# Patient Record
Sex: FEMALE
[De-identification: names, ages, dates, MRNs, and addresses within clinical notes are randomized per-mention and may not be internally consistent; named-entity substitution may affect disease eponyms.]

---

## 2023-02-21 PROBLEM — Z00.00 ENCOUNTER FOR PREVENTIVE HEALTH EXAMINATION: Status: ACTIVE | Noted: 2023-02-21

## 2023-02-22 ENCOUNTER — APPOINTMENT (OUTPATIENT)
Dept: ORTHOPEDIC SURGERY | Facility: CLINIC | Age: 53
End: 2023-02-22
Payer: COMMERCIAL

## 2023-02-22 DIAGNOSIS — M54.12 RADICULOPATHY, CERVICAL REGION: ICD-10-CM

## 2023-02-22 PROCEDURE — 72170 X-RAY EXAM OF PELVIS: CPT

## 2023-02-22 PROCEDURE — 99204 OFFICE O/P NEW MOD 45 MIN: CPT

## 2023-02-22 PROCEDURE — 73564 X-RAY EXAM KNEE 4 OR MORE: CPT | Mod: RT

## 2023-02-22 RX ORDER — MELOXICAM 15 MG/1
15 TABLET ORAL
Qty: 30 | Refills: 1 | Status: ACTIVE | COMMUNITY
Start: 2023-02-22 | End: 1900-01-01

## 2023-02-22 NOTE — HISTORY OF PRESENT ILLNESS
[0] : 0 [Dull/Aching] : dull/aching [Sharp] : sharp [Throbbing] : throbbing [Intermittent] : intermittent [de-identified] : 02/22/2023 DARIUS LAURENT 53 year old F is here for Right Knee/Lower back. States knee started after a hike in December. Has pain with stairs. No specific injury. Had prednisone recently which helped with the knee pain. Patient lives full time currently in the [facility].  \par \par  History of C5 radiculopathy has had PT in the past. Temporary relieve with PT.  Has pain in the LUE and numbness and tingling. Did not get relieve with prednisone. Does not want injections. Was offered surgery by previous doctor and declined surgery. [] : no [FreeTextEntry1] : Right Knee

## 2023-02-22 NOTE — IMAGING
[de-identified] : Knee Exam\par Alignment: Neutral \par Effusion: None\par Atrophy: None                                                \par Stable to Varus/valgus stress\par Posterior Drawer Test: negative\par Anterior Drawer Test: Negative\par Knee Extension/Flexion: 0 / 120\par \par Medial/lateral compartments\par Medial joint line: No Tenderness\par Lateral joint line: No Tenderness\par Adrianne test: negative\par \par Patellofemoral joint\par Medial patellar facet: no tenderness\par Patellar grind: Negative\par \par Tendons:\par Pes Anserine: No tenderness\par Gerdy’s Tubercle/ IT Band: No tenderness\par Quadriceps Tendon: No Tenderness\par patellar tendon: no Tenderness\par Tibial tubercle: not tenderness\par Calf: no Tenderness\par \par Neurovascular exam\par Muscle strength: 5/5\par Sensation to light touch: intact\par Distal pulses: 2+\par \par Left upper extremity\par 5/5 strength upper extremity\par SILT grossly intact.\par \par IMAGING:\par 2/22/23: 4v R mild OA in bilateral knees PF,

## 2023-04-18 ENCOUNTER — APPOINTMENT (OUTPATIENT)
Dept: ORTHOPEDIC SURGERY | Facility: CLINIC | Age: 53
End: 2023-04-18
Payer: COMMERCIAL

## 2023-04-18 DIAGNOSIS — M65.311 TRIGGER THUMB, RIGHT THUMB: ICD-10-CM

## 2023-04-18 DIAGNOSIS — M18.11 UNILATERAL PRIMARY OSTEOARTHRITIS OF FIRST CARPOMETACARPAL JOINT, RIGHT HAND: ICD-10-CM

## 2023-04-18 PROCEDURE — 99213 OFFICE O/P EST LOW 20 MIN: CPT

## 2023-04-18 PROCEDURE — 73140 X-RAY EXAM OF FINGER(S): CPT | Mod: RT

## 2023-04-18 NOTE — IMAGING
[de-identified] : RIGHT HAND\par skin intact. mild swelling of thumb.\par TTP to thumb A1 pulley > thumb A1 pulley.\par good wrist extension, flexion.\par good EPL, FPL. good finger extension, flex to full fist. good finger abduction and adduction. \par SILT median, ulnar, radial distributions.\par palpable radial pulse, brisk cap refill all digits.\par + triggering of thumb.\par \par \par XRAYS OF RIGHT THUMB: no acute displaced fracture or dislocation. djd of DIPJ.

## 2023-04-18 NOTE — ASSESSMENT
[FreeTextEntry1] : The condition was explained to the patient.\par \par Discussed that arthritis is a progressive degenerative process, and symptoms may have a waxing/waning course, which may be exacerbated by activity or trauma. Discussed treatment options - activity modification, bracing, steroid injection, or last resort surgery.\par Discussed increased propensity for stiffness due to underlying arthritis.\par \par Discussed risks and benefits of treatment options for tenosynovitis - activity modification, NSAID, splint, steroid injection, or surgery.\par \par Patient declined CSI.\par - recommend NSAID, activity modification, moist heat PRN.\par Reviewed contra-indicated medical conditions (eg liver disease, kidney disease, or GI ulcer/bleeding) or medications (eg blood thinners). Discussed possible GI and blood pressure side effects.\par \par F/u PRN.

## 2023-04-18 NOTE — HISTORY OF PRESENT ILLNESS
[Gradual] : gradual [Dull/Aching] : dull/aching [Intermittent] : intermittent [de-identified] : 4/18/23: 52yo RHD female (not working) presents for RIGHT thumb pain and catching/locking x 1 month.\par Denies injury.\par Not currently taking any medication for this.\par \par Hx: asthma. GERD. [] : no [FreeTextEntry1] : right thumb  [FreeTextEntry5] : DARIUS webb [RHD] 53 year old female is here today complaining of right thumb pain. onset of pain ~1 month ago, denies injury. pt is experiencing locking as well as pain.

## 2023-05-07 ENCOUNTER — FORM ENCOUNTER (OUTPATIENT)
Age: 53
End: 2023-05-07

## 2023-05-08 ENCOUNTER — APPOINTMENT (OUTPATIENT)
Dept: MRI IMAGING | Facility: CLINIC | Age: 53
End: 2023-05-08
Payer: COMMERCIAL

## 2023-05-08 PROCEDURE — 73721 MRI JNT OF LWR EXTRE W/O DYE: CPT | Mod: RT

## 2023-06-07 ENCOUNTER — APPOINTMENT (OUTPATIENT)
Dept: ORTHOPEDIC SURGERY | Facility: CLINIC | Age: 53
End: 2023-06-07
Payer: COMMERCIAL

## 2023-06-07 VITALS — WEIGHT: 180 LBS | HEIGHT: 62 IN | BODY MASS INDEX: 33.13 KG/M2

## 2023-06-07 PROCEDURE — 20610 DRAIN/INJ JOINT/BURSA W/O US: CPT | Mod: RT

## 2023-06-07 PROCEDURE — 99214 OFFICE O/P EST MOD 30 MIN: CPT | Mod: 25

## 2023-06-07 RX ORDER — METHYLPREDNISOLONE 4 MG/1
4 TABLET ORAL
Qty: 1 | Refills: 0 | Status: ACTIVE | COMMUNITY
Start: 2023-06-07 | End: 1900-01-01

## 2023-06-07 NOTE — IMAGING
[de-identified] : Knee Exam\par Alignment: Neutral \par Effusion: None\par Atrophy: None                                                \par Stable to Varus/valgus stress\par Posterior Drawer Test: negative\par Anterior Drawer Test: Negative\par Knee Extension/Flexion: 0 / 120\par \par Medial/lateral compartments\par Medial joint line: No Tenderness\par Lateral joint line: No Tenderness\par Adrianne test: negative\par \par Patellofemoral joint\par Medial patellar facet: no tenderness\par Patellar grind: Negative\par \par Tendons:\par Pes Anserine: No tenderness\par Gerdy’s Tubercle/ IT Band: No tenderness\par Quadriceps Tendon: No Tenderness\par patellar tendon: no Tenderness\par Tibial tubercle: not tenderness\par Calf: no Tenderness\par \par Neurovascular exam\par Muscle strength: 5/5\par Sensation to light touch: intact\par Distal pulses: 2+\par \par Left upper extremity\par 5/5 strength upper extremity\par SILT grossly intact.\par \par IMAGING:\par 2/22/23: 4v R mild OA in bilateral knees PF\par MRI:\par 1. Medial meniscal tear.\par 2. Lateral meniscal tear.\par 3. Arthrosis at the medial joint with no marrow edema or fracture.\par 4. 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 Joint effusion with synovitis.\par 6. Anterior soft tissue edema of the patella with no discrete bursitis and no fracture.\par 7. Hamstring and gastrocnemius tendinopathy with insertional fraying, soft tissue edema, and medial bursitis \par overlying the hamstrings of the tibia. no gum bleeding/no skin lumps

## 2023-06-07 NOTE — ASSESSMENT
[FreeTextEntry1] : 52 y/o F with PF OA, hx cervical radiculopathy\par All options were explained to patient including BOBO for neck, CSI for knee. Declines these measures\par Meloxicam prescribed and new PT rx given\par \par 06/07/2023 CSI of the right knee today

## 2023-06-07 NOTE — HISTORY OF PRESENT ILLNESS
[Dull/Aching] : dull/aching [Sharp] : sharp [Throbbing] : throbbing [Intermittent] : intermittent [8] : 8 [5] : 5 [Nothing helps with pain getting better] : Nothing helps with pain getting better [Sitting] : sitting [Walking] : walking [Exercising] : exercising [Stairs] : stairs [de-identified] : 02/22/2023 DARIUS LAURENT 53 year old F is here for Right Knee/Lower back. States knee started after a hike in December. Has pain with stairs. No specific injury. Had prednisone recently which helped with the knee pain. Patient lives full time currently in the [facility].  \par \par  History of C5 radiculopathy has had PT in the past. Temporary relieve with PT.  Has pain in the LUE and numbness and tingling. Did not get relieve with prednisone. Does not want injections. Was offered surgery by previous doctor and declined surgery.\par \par 06/07/2023 DARIUS LAURENT 53 year old female presents for Right knee/ Lower Back. Patient is here to review MRI results. Pain is on going, no improvement with physical therapy. Movement is limited due to pain levels.  [] : no [FreeTextEntry1] : Right Knee

## 2023-07-26 ENCOUNTER — APPOINTMENT (OUTPATIENT)
Dept: ORTHOPEDIC SURGERY | Facility: CLINIC | Age: 53
End: 2023-07-26
Payer: COMMERCIAL

## 2023-07-26 ENCOUNTER — TRANSCRIPTION ENCOUNTER (OUTPATIENT)
Age: 53
End: 2023-07-26

## 2023-07-26 DIAGNOSIS — M17.10 UNILATERAL PRIMARY OSTEOARTHRITIS, UNSPECIFIED KNEE: ICD-10-CM

## 2023-07-26 PROCEDURE — 99213 OFFICE O/P EST LOW 20 MIN: CPT | Mod: 25

## 2023-07-26 NOTE — IMAGING
[de-identified] : Knee Exam\par Alignment: Neutral \par Effusion: None\par Atrophy: None                                                \par Stable to Varus/valgus stress\par Posterior Drawer Test: negative\par Anterior Drawer Test: Negative\par Knee Extension/Flexion: 0 / 120\par \par Medial/lateral compartments\par Medial joint line: No Tenderness\par Lateral joint line: No Tenderness\par Adrianne test: negative\par \par Patellofemoral joint\par Medial patellar facet: no tenderness\par Patellar grind: Negative\par \par Tendons:\par Pes Anserine: No tenderness\par Gerdy’s Tubercle/ IT Band: No tenderness\par Quadriceps Tendon: No Tenderness\par patellar tendon: no Tenderness\par Tibial tubercle: not tenderness\par Calf: no Tenderness\par \par Neurovascular exam\par Muscle strength: 5/5\par Sensation to light touch: intact\par Distal pulses: 2+\par \par Left upper extremity\par 5/5 strength upper extremity\par SILT grossly intact.\par \par IMAGING:\par 2/22/23: 4v R mild OA in bilateral knees PF\par MRI:\par 1. Medial meniscal tear.\par 2. Lateral meniscal tear.\par 3. Arthrosis at the medial joint with no marrow edema or fracture.\par 4. 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 Joint effusion with synovitis.\par 6. Anterior soft tissue edema of the patella with no discrete bursitis and no fracture.\par 7. Hamstring and gastrocnemius tendinopathy with insertional fraying, soft tissue edema, and medial bursitis \par overlying the hamstrings of the tibia.

## 2023-07-26 NOTE — HISTORY OF PRESENT ILLNESS
[8] : 8 [5] : 5 [Dull/Aching] : dull/aching [Sharp] : sharp [Throbbing] : throbbing [Intermittent] : intermittent [Nothing helps with pain getting better] : Nothing helps with pain getting better [Sitting] : sitting [Walking] : walking [Exercising] : exercising [Stairs] : stairs [de-identified] : 02/22/2023 DARIUS LAURENT 53 year old F is here for Right Knee/Lower back. States knee started after a hike in December. Has pain with stairs. No specific injury. Had prednisone recently which helped with the knee pain. Patient lives full time currently in the [facility].  \par \par  History of C5 radiculopathy has had PT in the past. Temporary relieve with PT.  Has pain in the LUE and numbness and tingling. Did not get relieve with prednisone. Does not want injections. Was offered surgery by previous doctor and declined surgery.\par \par 06/07/2023 DARIUS LAURENT 53 year old female presents for Right knee/ Lower Back. Patient is here to review MRI results. Pain is on going, no improvement with physical therapy. Movement is limited due to pain levels. \par \par 07/26/2023: DARIUS CHAUDHARY is a 53 year y.o. female here today complaining of Right Knee pain. Pt states that she is going to the [place] in a few weeks and she has questions about the CSI injection.  [] : no [FreeTextEntry1] : Right Knee

## 2023-07-26 NOTE — ASSESSMENT
[FreeTextEntry1] : 52 y/o F with PF OA, hx cervical radiculopathy\par All options were explained to patient including BOBO for neck, CSI for knee. Declines these measures\par Meloxicam prescribed and new PT rx given\par \par 06/07/2023 CSI of the right knee today\par \par 7/26/23: Plan for CSI before patient leaves for [place]

## 2023-08-23 ENCOUNTER — APPOINTMENT (OUTPATIENT)
Dept: ORTHOPEDIC SURGERY | Facility: CLINIC | Age: 53
End: 2023-08-23
Payer: COMMERCIAL

## 2023-08-23 PROCEDURE — 20610 DRAIN/INJ JOINT/BURSA W/O US: CPT | Mod: RT

## 2023-08-23 NOTE — IMAGING
[de-identified] : Knee Exam\par  Alignment: Neutral \par  Effusion: None\par  Atrophy: None                                                \par  Stable to Varus/valgus stress\par  Posterior Drawer Test: negative\par  Anterior Drawer Test: Negative\par  Knee Extension/Flexion: 0 / 120\par  \par  Medial/lateral compartments\par  Medial joint line: No Tenderness\par  Lateral joint line: No Tenderness\par  Adrianne test: negative\par  \par  Patellofemoral joint\par  Medial patellar facet: no tenderness\par  Patellar grind: Negative\par  \par  Tendons:\par  Pes Anserine: No tenderness\par  Gerdy's Tubercle/ IT Band: No tenderness\par  Quadriceps Tendon: No Tenderness\par  patellar tendon: no Tenderness\par  Tibial tubercle: not tenderness\par  Calf: no Tenderness\par  \par  Neurovascular exam\par  Muscle strength: 5/5\par  Sensation to light touch: intact\par  Distal pulses: 2+\par  \par  Left upper extremity\par  5/5 strength upper extremity\par  SILT grossly intact.\par  \par  IMAGING:\par  2/22/23: 4v R mild OA in bilateral knees PF\par  MRI:\par  1. Medial meniscal tear.\par  2. Lateral meniscal tear.\par  3. Arthrosis at the medial joint with no marrow edema or fracture.\par  4. 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 Joint effusion with synovitis.\par  6. Anterior soft tissue edema of the patella with no discrete bursitis and no fracture.\par  7. Hamstring and gastrocnemius tendinopathy with insertional fraying, soft tissue edema, and medial bursitis \par  overlying the hamstrings of the tibia.

## 2023-08-23 NOTE — ASSESSMENT
[FreeTextEntry1] : 54 y/o F with PF OA, hx cervical radiculopathy All options were explained to patient including BOBO for neck, CSI for knee. Declines these measures Meloxicam prescribed and new PT rx given  06/07/2023 CSI of the right knee today  7/26/23: Plan for CSI before patient leaves for [facility]   08/23/2023 CSI of the right knee today.

## 2023-08-23 NOTE — PROCEDURE
[FreeTextEntry3] : The risks, benefits and alternatives to the injection were discussed with the patient. The decision was made to proceed with the injection to reduce inflammation within the area. Verbal consent was obtained for the procedure. The right knee was cleaned with alcohol and ethyl chloride was sprayed topically. 1cc of 40mg Kenalog and 4cc of 1% lidocaine was injected. The patient tolerated the procedure well and was instructed to ice the affected joint as needed later today. Activities can be performed as tolerated

## 2024-03-25 ENCOUNTER — APPOINTMENT (OUTPATIENT)
Dept: ORTHOPEDIC SURGERY | Facility: CLINIC | Age: 54
End: 2024-03-25
Payer: SELF-PAY

## 2024-03-25 DIAGNOSIS — M17.11 UNILATERAL PRIMARY OSTEOARTHRITIS, RIGHT KNEE: ICD-10-CM

## 2024-03-25 PROCEDURE — 99213 OFFICE O/P EST LOW 20 MIN: CPT

## 2024-03-25 NOTE — ASSESSMENT
[FreeTextEntry1] : 52 y/o F with PF OA, hx cervical radiculopathy All options were explained to patient including BOBO for neck, CSI for knee. Declines these measures Meloxicam prescribed and new PT rx given  06/07/2023 CSI of the right knee today  7/26/23: Plan for CSI before patient leaves for [facility]   08/23/2023 CSI of the right knee today.  03/25/2024 deferred CSI of the right knee today discussed gels and PRP in the future  fu with sport for eval of left shoulder, can consider CSI of the right knee at that time also.

## 2024-03-25 NOTE — HISTORY OF PRESENT ILLNESS
[de-identified] : 02/22/2023 DARIUS LAURENT 53 year old F is here for Right Knee/Lower back. States knee started after a hike in December. Has pain with stairs. No specific injury. Had prednisone recently which helped with the knee pain. Patient lives full time currently in the [facility].     History of C5 radiculopathy has had PT in the past. Temporary relieve with PT.  Has pain in the LUE and numbness and tingling. Did not get relieve with prednisone. Does not want injections. Was offered surgery by previous doctor and declined surgery.  06/07/2023 DARIUS LAURENT 53 year old female presents for Right knee/ Lower Back. Patient is here to review MRI results. Pain is on going, no improvement with physical therapy. Movement is limited due to pain levels.   07/26/2023: DARIUS CHAUDHARY is a 53 year y.o. female here today complaining of Right Knee pain. Pt states that she is going to the [place] in a few weeks and she has questions about the CSI injection.   08/23/2023: Pt is here to follow up on her right knee. she states pain is intermittent. she is here today for a CSI.   03/25/2024: DARIUS LAURENT is here today for right knee follow up. pain increased since last visit. states CSI wore off 1 month ago. would like to discuss additional treatment options.  CSI of right knee gave good relief for many months.

## 2024-03-25 NOTE — IMAGING
[de-identified] : Knee Exam Alignment: Neutral  Effusion: None Atrophy: None                                                 Stable to Varus/valgus stress Posterior Drawer Test: negative Anterior Drawer Test: Negative Knee Extension/Flexion: 0 / 120  Medial/lateral compartments Medial joint line: No Tenderness Lateral joint line: No Tenderness Adrianne test: negative  Patellofemoral joint Medial patellar facet: no tenderness Patellar grind: Negative  Tendons: Pes Anserine: No tenderness Gerdy's Tubercle/ IT Band: No tenderness Quadriceps Tendon: No Tenderness patellar tendon: no Tenderness Tibial tubercle: not tenderness Calf: no Tenderness  Neurovascular exam Muscle strength: 5/5 Sensation to light touch: intact Distal pulses: 2+  Left upper extremity 5/5 strength upper extremity SILT grossly intact.  IMAGIN23: 4v R mild OA in bilateral knees PF MRI: 1. Medial meniscal tear. 2. Lateral meniscal tear. 3. Arthrosis at the medial joint with no marrow edema or fracture. 4. 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 5. Joint effusion with synovitis. 6. Anterior soft tissue edema of the patella with no discrete bursitis and no fracture. 7. Hamstring and gastrocnemius tendinopathy with insertional fraying, soft tissue edema, and medial bursitis  overlying the hamstrings of the tibia.

## 2024-04-04 ENCOUNTER — APPOINTMENT (OUTPATIENT)
Dept: ORTHOPEDIC SURGERY | Facility: CLINIC | Age: 54
End: 2024-04-04
Payer: COMMERCIAL

## 2024-04-04 VITALS — BODY MASS INDEX: 30.36 KG/M2 | HEIGHT: 62 IN | WEIGHT: 165 LBS

## 2024-04-04 PROCEDURE — 73010 X-RAY EXAM OF SHOULDER BLADE: CPT | Mod: LT

## 2024-04-04 PROCEDURE — 73030 X-RAY EXAM OF SHOULDER: CPT | Mod: LT

## 2024-04-04 PROCEDURE — 99214 OFFICE O/P EST MOD 30 MIN: CPT

## 2024-04-04 RX ORDER — MELOXICAM 7.5 MG/1
7.5 TABLET ORAL
Qty: 30 | Refills: 0 | Status: ACTIVE | COMMUNITY
Start: 2024-04-04 | End: 1900-01-01

## 2024-04-04 NOTE — DISCUSSION/SUMMARY
[de-identified] : We discussed their diagnosis and treatment options at length. We will first attempt conservative treatment with a course of PT and anti-inflammatory medication. The patient was provided with a prescription to work on scapular strengthening and rotator cuff strengthening on the impingement syndrome protocol. We also discussed the possible of a corticosteroid injection in the future in order to help decrease inflammation and pain so that they can perform better therapy.    Follow up in 6 weeks to re-evaluate progress with therapy

## 2024-04-04 NOTE — IMAGING
[de-identified] : LEFT SHOULDER  Inspection: No swelling.   Palpation: Tenderness is noted at the bicipital groove, anterior and lateral.   Range of motion: There is pain with range of motion.  , ER 55, @90ER 90, @90IR 30  Strength: There is pain and discomfort with strength testing.  Forward Flexion 5/5. Abduction 5/5.  External Rotation 5-/5 (painful) and Internal Rotation 5/5   Neurological testings: motor and sensor intact distally.  Ligament Stability and Special Tests:   There is positive arc of pain.   Shoulder apprehension: neg  Shoulder relocation: neg  Obriens test: pos  Biceps Active test: neg  Miramontes Labral Shear: neg  Impingement testing: pos  Guerrero testing: pos  Whipple: pos  Cross Body Adduction: neg

## 2024-04-04 NOTE — HISTORY OF PRESENT ILLNESS
[de-identified] : Date of Injury/Onset: Patient is here for evaluation of left shoulder. Patient notes pain since last August with no prior injury. Patient has been evaluated for this and was diagnosed with early arthritis. Patient was given injection but didn't relieve pain. Patient also did physical therapy but hasn't helped. Patient notes pain with most arm movements. Patient notes at times she feels pain in the elbow but no pain in the neck. Patient reports n/t in the hands. Pain: At Rest: 5 With Activity: 8 Mechanism of injury: No This is NOT a Work Related Injury being treated under Worker's Compensation. This is NOT an athletic injury occurring associated with an interscholastic or organized sports team. Quality of symptoms: Dull pain and sharp pain Improves with: OTC Med Worse with: Any movement of the arm Prior treatment: Injection and PT Prior Imaging: None available Reports Available For Review Today: Out of work/sport: Not working School/Sport/Position/Occupation: Personal goal: Additional Information: no hx of dm no hx of hypothyroidism performe PT 1x in [facility] had an HA injection [facility]

## 2024-04-04 NOTE — DATA REVIEWED
[FreeTextEntry1] : Right / Left X-Ray Examination of the SHOULDER 2 views:  no fractures, subluxations or dislocations.   X-Ray Examination of the SCAPULA 1 or 2 views shows: there is an acromial spur

## 2024-04-29 ENCOUNTER — APPOINTMENT (OUTPATIENT)
Dept: ORTHOPEDIC SURGERY | Facility: CLINIC | Age: 54
End: 2024-04-29
Payer: COMMERCIAL

## 2024-04-29 DIAGNOSIS — M75.112 INCOMPLETE ROTATOR CUFF TEAR OR RUPTURE OF LEFT SHOULDER, NOT SPECIFIED AS TRAUMATIC: ICD-10-CM

## 2024-04-29 DIAGNOSIS — M75.42 IMPINGEMENT SYNDROME OF LEFT SHOULDER: ICD-10-CM

## 2024-04-29 PROCEDURE — 20610 DRAIN/INJ JOINT/BURSA W/O US: CPT | Mod: RT

## 2024-04-29 PROCEDURE — 99213 OFFICE O/P EST LOW 20 MIN: CPT | Mod: 25

## 2024-04-29 NOTE — IMAGING
[de-identified] : LEFT SHOULDER  Inspection: No swelling.   Palpation: Tenderness is noted at the bicipital groove, anterior and lateral.   Range of motion: There is pain with range of motion.  , ER 55, @90ER 90, @90IR 30  Strength: There is pain and discomfort with strength testing.  Forward Flexion 5/5. Abduction 5/5.  External Rotation 5-/5 (painful) and Internal Rotation 5/5   Neurological testings: motor and sensor intact distally.  Ligament Stability and Special Tests:   There is positive arc of pain.   Shoulder apprehension: neg  Shoulder relocation: neg  Obriens test: pos  Biceps Active test: neg  Miramontes Labral Shear: neg  Impingement testing: pos  Guerrero testing: pos  Whipple: pos  Cross Body Adduction: neg  RIGHT KNEE  Inspection:  mild effusion  Palpation: medial joint line tenderness   Knee Range of Motion:  0-130   Strength: 5/5 Quadriceps strength, 5/5 Hamstring strength  Neurological: light touch is intact throughout  Ligament Stability and Special Tests:   McMurrays: Positive  Lachman: neg  Pivot Shift: neg  Posterior Drawer: neg  Valgus: neg  Varus: neg  Patella Apprehension: neg  Patella Maltracking: neg

## 2024-04-29 NOTE — DATA REVIEWED
[FreeTextEntry1] : Left X-Ray Examination of the SHOULDER 2 views:  no fractures, subluxations or dislocations.   X-Ray Examination of the SCAPULA 1 or 2 views shows: there is an acromial spur

## 2024-04-29 NOTE — PROCEDURE
[FreeTextEntry1] : r knee injection [FreeTextEntry2] : R knee OA [FreeTextEntry3] : Procedure: Kenalog injection of the Right Knee joint  Indication:  Inflammation and Joint pain.  Risk, benefits and alternatives were discussed with the patient. Potential complications include bleeding and infection.  Alcohol was used to prep the area.  Ethyl chloride spray was used as a topical anesthetic.  Using sterile technique, the aspiration/injection needle was then directed from anterolateral portal A 1.5 inch 21g needle was used to inject 3 mL of 1% Lidocaine, 3 mL 0.25% Bupivacaine and 1 mL 40mg/mL triamcinolone.  A bandage was applied.  The patient tolerated the procedure well.  Complications: None.  Patient instructed to avoid strenuous activity for 2 day(s).  Follow-up in the office as needed, in 3 months for repeat injection, if pain remains unresolved, or for further concerns.  Specifically counseled regarding the signs and symptoms of potential intraarticular infection and instructed to present promptly to clinic or hospital if such signs and symptoms arise.

## 2024-04-29 NOTE — DISCUSSION/SUMMARY
[de-identified] : We discussed their diagnosis and treatment options at length. We will first attempt conservative treatment with a course of PT and anti-inflammatory medication. The patient was provided with a prescription to work on scapular strengthening and rotator cuff strengthening on the impingement syndrome protocol. We also discussed the possible of a corticosteroid injection in the future in order to help decrease inflammation and pain so that they can perform better therapy.    Follow up in 6 weeks to re-evaluate progress with therapy ****** Pt L shoulder pain resolved R Knee pain bothering her, performed an unplanned R knee CSI injection Discussed with patient that she should not be on longstanding doses of prednisone.  Patient states her  prescribes it for her.  I discussed with her there are significant risks to taking prednisone for long periods of time including AVN, increased risk of tendon rupture, increased risk of infection, increased risk of fragility fractures.  Patient understands this and states she will stop.  Patient states she took it because her knee pain was so severe.  I educated her that on her examination her exam was consistent with a medial meniscus tear.  I discussed with her that her MRI was suggestive of a medial meniscus tear.  I discussed with her that should her symptoms fail to improve or worsen with nonoperative treatment we could consider a partial medial meniscectomy.  Patient states she is going to [facility] for the next 6 months and will follow-up with me when she returns from her trip.  Plan for next visit: 1.  Discussed arthroscopic right knee surgery, partial medial meniscectomy, discuss left shoulder pathology if it recurs.

## 2024-04-29 NOTE — HISTORY OF PRESENT ILLNESS
[de-identified] : Date of Injury/Onset: Patient is here for evaluation of left shoulder. Patient notes pain since last August with no prior injury. Patient has been evaluated for this and was diagnosed with early arthritis. Patient was given injection but didn't relieve pain. Patient also did physical therapy but hasn't helped. Patient notes pain with most arm movements. Patient notes at times she feels pain in the elbow but no pain in the neck. Patient reports n/t in the hands. Pain: At Rest: 5 With Activity: 8 Mechanism of injury: No This is NOT a Work Related Injury being treated under Worker's Compensation. This is NOT an athletic injury occurring associated with an interscholastic or organized sports team. Quality of symptoms: Dull pain and sharp pain Improves with: OTC Med Worse with: Any movement of the arm Prior treatment: Injection and PT Prior Imaging: None available Reports Available For Review Today: Out of work/sport: Not working School/Sport/Position/Occupation: Personal goal: Additional Information: no hx of dm no hx of hypothyroidism performe PT 1x in [facility] had an HA injection [facility]   04/29/2024 DARIUS LAURENT 54 year F is here today to follow up on left shoulder, patient states significant improvements since last visit, patient is doing PT and taking PREDNISolone meds, patient states she would like to get CSI for her right knee, patient was seen dr. Lujan and has an appoinment with him next week but will like to do it today since she is here.

## 2025-01-21 ENCOUNTER — APPOINTMENT (OUTPATIENT)
Dept: MRI IMAGING | Facility: CLINIC | Age: 55
End: 2025-01-21

## 2025-01-27 ENCOUNTER — APPOINTMENT (OUTPATIENT)
Dept: ORTHOPEDIC SURGERY | Facility: CLINIC | Age: 55
End: 2025-01-27
Payer: COMMERCIAL

## 2025-01-27 DIAGNOSIS — M17.11 UNILATERAL PRIMARY OSTEOARTHRITIS, RIGHT KNEE: ICD-10-CM

## 2025-01-27 PROCEDURE — 99213 OFFICE O/P EST LOW 20 MIN: CPT

## 2025-01-31 ENCOUNTER — APPOINTMENT (OUTPATIENT)
Dept: MRI IMAGING | Facility: CLINIC | Age: 55
End: 2025-01-31
Payer: COMMERCIAL

## 2025-01-31 PROCEDURE — 73721 MRI JNT OF LWR EXTRE W/O DYE: CPT | Mod: RT

## 2025-02-13 ENCOUNTER — APPOINTMENT (OUTPATIENT)
Dept: ORTHOPEDIC SURGERY | Facility: CLINIC | Age: 55
End: 2025-02-13
Payer: COMMERCIAL

## 2025-02-13 DIAGNOSIS — S83.231A COMPLEX TEAR OF MEDIAL MENISCUS, CURRENT INJURY, RIGHT KNEE, INITIAL ENCOUNTER: ICD-10-CM

## 2025-02-13 PROCEDURE — 99214 OFFICE O/P EST MOD 30 MIN: CPT

## 2025-02-13 PROCEDURE — 73564 X-RAY EXAM KNEE 4 OR MORE: CPT | Mod: RT

## 2025-03-03 ENCOUNTER — APPOINTMENT (OUTPATIENT)
Dept: ORTHOPEDIC SURGERY | Facility: CLINIC | Age: 55
End: 2025-03-03
Payer: SELF-PAY

## 2025-03-03 DIAGNOSIS — M17.10 UNILATERAL PRIMARY OSTEOARTHRITIS, UNSPECIFIED KNEE: ICD-10-CM

## 2025-03-03 PROCEDURE — 99024 POSTOP FOLLOW-UP VISIT: CPT

## 2025-03-03 PROCEDURE — 005KIT: CUSTOM

## 2025-03-17 ENCOUNTER — APPOINTMENT (OUTPATIENT)
Dept: ORTHOPEDIC SURGERY | Facility: CLINIC | Age: 55
End: 2025-03-17
Payer: COMMERCIAL

## 2025-03-17 DIAGNOSIS — M17.10 UNILATERAL PRIMARY OSTEOARTHRITIS, UNSPECIFIED KNEE: ICD-10-CM

## 2025-03-17 DIAGNOSIS — S83.231A COMPLEX TEAR OF MEDIAL MENISCUS, CURRENT INJURY, RIGHT KNEE, INITIAL ENCOUNTER: ICD-10-CM

## 2025-03-17 PROCEDURE — 99213 OFFICE O/P EST LOW 20 MIN: CPT

## 2025-04-14 ENCOUNTER — APPOINTMENT (OUTPATIENT)
Dept: ORTHOPEDIC SURGERY | Facility: CLINIC | Age: 55
End: 2025-04-14
Payer: COMMERCIAL

## 2025-04-14 DIAGNOSIS — S83.231A COMPLEX TEAR OF MEDIAL MENISCUS, CURRENT INJURY, RIGHT KNEE, INITIAL ENCOUNTER: ICD-10-CM

## 2025-04-14 DIAGNOSIS — M17.10 UNILATERAL PRIMARY OSTEOARTHRITIS, UNSPECIFIED KNEE: ICD-10-CM

## 2025-04-14 PROCEDURE — 99213 OFFICE O/P EST LOW 20 MIN: CPT
